# Patient Record
Sex: MALE | Race: WHITE
[De-identification: names, ages, dates, MRNs, and addresses within clinical notes are randomized per-mention and may not be internally consistent; named-entity substitution may affect disease eponyms.]

---

## 2018-06-09 PROBLEM — Z00.00 ENCOUNTER FOR PREVENTIVE HEALTH EXAMINATION: Status: ACTIVE | Noted: 2018-06-09

## 2018-07-09 ENCOUNTER — APPOINTMENT (OUTPATIENT)
Dept: CARDIOLOGY | Facility: CLINIC | Age: 83
End: 2018-07-09

## 2018-07-09 VITALS
DIASTOLIC BLOOD PRESSURE: 75 MMHG | OXYGEN SATURATION: 96 % | HEART RATE: 56 BPM | HEIGHT: 66 IN | SYSTOLIC BLOOD PRESSURE: 189 MMHG | BODY MASS INDEX: 23.46 KG/M2 | WEIGHT: 146 LBS | TEMPERATURE: 97.5 F

## 2018-07-09 DIAGNOSIS — Z82.49 FAMILY HISTORY OF ISCHEMIC HEART DISEASE AND OTHER DISEASES OF THE CIRCULATORY SYSTEM: ICD-10-CM

## 2018-07-09 DIAGNOSIS — Z80.0 FAMILY HISTORY OF MALIGNANT NEOPLASM OF DIGESTIVE ORGANS: ICD-10-CM

## 2018-07-09 DIAGNOSIS — Z86.39 PERSONAL HISTORY OF OTHER ENDOCRINE, NUTRITIONAL AND METABOLIC DISEASE: ICD-10-CM

## 2018-07-09 DIAGNOSIS — Z86.59 PERSONAL HISTORY OF OTHER MENTAL AND BEHAVIORAL DISORDERS: ICD-10-CM

## 2018-07-09 DIAGNOSIS — Z86.69 PERSONAL HISTORY OF OTHER DISEASES OF THE NERVOUS SYSTEM AND SENSE ORGANS: ICD-10-CM

## 2018-07-09 DIAGNOSIS — Z86.79 PERSONAL HISTORY OF OTHER DISEASES OF THE CIRCULATORY SYSTEM: ICD-10-CM

## 2018-07-09 DIAGNOSIS — Z87.891 PERSONAL HISTORY OF NICOTINE DEPENDENCE: ICD-10-CM

## 2018-07-09 DIAGNOSIS — E53.8 DEFICIENCY OF OTHER SPECIFIED B GROUP VITAMINS: ICD-10-CM

## 2018-07-09 DIAGNOSIS — S72.009A FRACTURE OF UNSPECIFIED PART OF NECK OF UNSPECIFIED FEMUR, INITIAL ENCOUNTER FOR CLOSED FRACTURE: ICD-10-CM

## 2018-07-09 DIAGNOSIS — M19.90 UNSPECIFIED OSTEOARTHRITIS, UNSPECIFIED SITE: ICD-10-CM

## 2018-07-22 ENCOUNTER — NON-APPOINTMENT (OUTPATIENT)
Age: 83
End: 2018-07-22

## 2018-07-22 PROBLEM — Z80.0 FAMILY HISTORY OF MALIGNANT NEOPLASM OF COLON: Status: ACTIVE | Noted: 2018-07-22

## 2018-07-22 PROBLEM — Z86.39 HISTORY OF DIABETES MELLITUS: Status: RESOLVED | Noted: 2018-07-22 | Resolved: 2018-07-22

## 2018-07-22 PROBLEM — Z86.69 HISTORY OF PERIPHERAL NEUROPATHY: Status: RESOLVED | Noted: 2018-07-22 | Resolved: 2018-07-22

## 2018-07-22 PROBLEM — Z86.79 HISTORY OF HYPERTENSION: Status: RESOLVED | Noted: 2018-07-22 | Resolved: 2018-07-22

## 2018-07-22 PROBLEM — Z86.39 HISTORY OF HYPERLIPIDEMIA: Status: RESOLVED | Noted: 2018-07-22 | Resolved: 2018-07-22

## 2018-07-22 PROBLEM — Z86.59 HISTORY OF DEMENTIA: Status: RESOLVED | Noted: 2018-07-22 | Resolved: 2018-07-22

## 2018-07-22 PROBLEM — S72.009A CLOSED FRACTURE OF NECK OF FEMUR: Status: RESOLVED | Noted: 2018-07-22 | Resolved: 2018-07-22

## 2018-07-22 PROBLEM — Z87.891 FORMER SMOKER: Status: ACTIVE | Noted: 2018-07-22

## 2018-07-22 PROBLEM — Z82.49 FAMILY HISTORY OF CONGESTIVE HEART FAILURE: Status: ACTIVE | Noted: 2018-07-22

## 2018-07-22 PROBLEM — Z86.79 HISTORY OF PERIPHERAL ARTERIAL DISEASE: Status: RESOLVED | Noted: 2018-07-22 | Resolved: 2018-07-22

## 2018-07-22 PROBLEM — E53.8 VITAMIN B12 DEFICIENCY: Status: RESOLVED | Noted: 2018-07-22 | Resolved: 2018-07-22

## 2018-07-22 PROBLEM — Z86.69 HISTORY OF DEAFNESS: Status: RESOLVED | Noted: 2018-07-22 | Resolved: 2018-07-22

## 2018-07-22 PROBLEM — M19.90 OSTEOARTHRITIS: Status: RESOLVED | Noted: 2018-07-22 | Resolved: 2018-07-22

## 2018-07-22 PROBLEM — Z82.49 FAMILY HISTORY OF ACUTE MYOCARDIAL INFARCTION: Status: ACTIVE | Noted: 2018-07-22

## 2018-09-07 ENCOUNTER — APPOINTMENT (OUTPATIENT)
Dept: CARDIOLOGY | Facility: CLINIC | Age: 83
End: 2018-09-07

## 2018-09-07 VITALS
OXYGEN SATURATION: 98 % | DIASTOLIC BLOOD PRESSURE: 76 MMHG | TEMPERATURE: 97.8 F | BODY MASS INDEX: 22.13 KG/M2 | HEIGHT: 67 IN | HEART RATE: 60 BPM | WEIGHT: 141 LBS | SYSTOLIC BLOOD PRESSURE: 185 MMHG

## 2018-09-09 ENCOUNTER — NON-APPOINTMENT (OUTPATIENT)
Age: 83
End: 2018-09-09

## 2018-09-10 LAB
ANION GAP SERPL CALC-SCNC: 14 MMOL/L
BASOPHILS # BLD AUTO: 0.01 K/UL
BASOPHILS NFR BLD AUTO: 0.2 %
BUN SERPL-MCNC: 14 MG/DL
CALCIUM SERPL-MCNC: 9.1 MG/DL
CHLORIDE SERPL-SCNC: 104 MMOL/L
CHOLEST SERPL-MCNC: 132 MG/DL
CHOLEST/HDLC SERPL: 3.7 RATIO
CO2 SERPL-SCNC: 25 MMOL/L
CREAT SERPL-MCNC: 0.95 MG/DL
EOSINOPHIL # BLD AUTO: 0.1 K/UL
EOSINOPHIL NFR BLD AUTO: 1.5 %
GLUCOSE SERPL-MCNC: 110 MG/DL
HCT VFR BLD CALC: 40.3 %
HDLC SERPL-MCNC: 36 MG/DL
HGB BLD-MCNC: 12.4 G/DL
IMM GRANULOCYTES NFR BLD AUTO: 0.2 %
LDLC SERPL CALC-MCNC: 82 MG/DL
LYMPHOCYTES # BLD AUTO: 1.01 K/UL
LYMPHOCYTES NFR BLD AUTO: 15.2 %
MAN DIFF?: NORMAL
MCHC RBC-ENTMCNC: 30.6 PG
MCHC RBC-ENTMCNC: 30.8 GM/DL
MCV RBC AUTO: 99.5 FL
MONOCYTES # BLD AUTO: 0.53 K/UL
MONOCYTES NFR BLD AUTO: 8 %
NEUTROPHILS # BLD AUTO: 5 K/UL
NEUTROPHILS NFR BLD AUTO: 74.9 %
PLATELET # BLD AUTO: 325 K/UL
POTASSIUM SERPL-SCNC: 4.4 MMOL/L
RBC # BLD: 4.05 M/UL
RBC # FLD: 15.1 %
SODIUM SERPL-SCNC: 143 MMOL/L
TRIGL SERPL-MCNC: 68 MG/DL
WBC # FLD AUTO: 6.66 K/UL

## 2018-10-29 ENCOUNTER — APPOINTMENT (OUTPATIENT)
Dept: CARDIOLOGY | Facility: CLINIC | Age: 83
End: 2018-10-29

## 2018-11-26 ENCOUNTER — RX RENEWAL (OUTPATIENT)
Age: 83
End: 2018-11-26

## 2018-12-18 ENCOUNTER — RX RENEWAL (OUTPATIENT)
Age: 83
End: 2018-12-18

## 2018-12-31 ENCOUNTER — RX RENEWAL (OUTPATIENT)
Age: 83
End: 2018-12-31

## 2019-01-01 ENCOUNTER — APPOINTMENT (OUTPATIENT)
Dept: INTERNAL MEDICINE | Facility: CLINIC | Age: 84
End: 2019-01-01

## 2019-01-01 ENCOUNTER — RX RENEWAL (OUTPATIENT)
Age: 84
End: 2019-01-01

## 2019-01-01 ENCOUNTER — APPOINTMENT (OUTPATIENT)
Dept: INTERNAL MEDICINE | Facility: CLINIC | Age: 84
End: 2019-01-01
Payer: MEDICARE

## 2019-01-01 VITALS
HEIGHT: 67 IN | WEIGHT: 136 LBS | SYSTOLIC BLOOD PRESSURE: 120 MMHG | DIASTOLIC BLOOD PRESSURE: 68 MMHG | BODY MASS INDEX: 21.35 KG/M2

## 2019-01-01 DIAGNOSIS — Z12.5 ENCOUNTER FOR SCREENING FOR MALIGNANT NEOPLASM OF PROSTATE: ICD-10-CM

## 2019-01-01 DIAGNOSIS — F32.9 MAJOR DEPRESSIVE DISORDER, SINGLE EPISODE, UNSPECIFIED: ICD-10-CM

## 2019-01-01 DIAGNOSIS — I25.10 ATHEROSCLEROTIC HEART DISEASE OF NATIVE CORONARY ARTERY W/OUT ANGINA PECTORIS: ICD-10-CM

## 2019-01-01 LAB
ALBUMIN SERPL ELPH-MCNC: 4.1 G/DL
ALP BLD-CCNC: 81 U/L
ALT SERPL-CCNC: 9 U/L
ANION GAP SERPL CALC-SCNC: 15 MMOL/L
AST SERPL-CCNC: 14 U/L
BASOPHILS # BLD AUTO: 0.03 K/UL
BASOPHILS NFR BLD AUTO: 0.4 %
BILIRUB SERPL-MCNC: 0.5 MG/DL
BUN SERPL-MCNC: 23 MG/DL
CALCIUM SERPL-MCNC: 9.6 MG/DL
CHLORIDE SERPL-SCNC: 104 MMOL/L
CHOLEST SERPL-MCNC: 148 MG/DL
CHOLEST/HDLC SERPL: 3.1 RATIO
CO2 SERPL-SCNC: 25 MMOL/L
CREAT SERPL-MCNC: 0.94 MG/DL
EOSINOPHIL # BLD AUTO: 0.17 K/UL
EOSINOPHIL NFR BLD AUTO: 2 %
ESTIMATED AVERAGE GLUCOSE: 171 MG/DL
GLUCOSE SERPL-MCNC: 205 MG/DL
HBA1C MFR BLD HPLC: 7.6 %
HCT VFR BLD CALC: 41.9 %
HDLC SERPL-MCNC: 48 MG/DL
HGB BLD-MCNC: 13.6 G/DL
IMM GRANULOCYTES NFR BLD AUTO: 0.2 %
LDLC SERPL CALC-MCNC: 86 MG/DL
LYMPHOCYTES # BLD AUTO: 1.33 K/UL
LYMPHOCYTES NFR BLD AUTO: 15.8 %
MAN DIFF?: NORMAL
MCHC RBC-ENTMCNC: 32.5 GM/DL
MCHC RBC-ENTMCNC: 32.6 PG
MCV RBC AUTO: 100.5 FL
MONOCYTES # BLD AUTO: 0.58 K/UL
MONOCYTES NFR BLD AUTO: 6.9 %
NEUTROPHILS # BLD AUTO: 6.29 K/UL
NEUTROPHILS NFR BLD AUTO: 74.7 %
PLATELET # BLD AUTO: 232 K/UL
POTASSIUM SERPL-SCNC: 4.6 MMOL/L
PROT SERPL-MCNC: 7.1 G/DL
PSA SERPL-MCNC: 2.96 NG/ML
RBC # BLD: 4.17 M/UL
RBC # FLD: 13.9 %
SODIUM SERPL-SCNC: 144 MMOL/L
TRIGL SERPL-MCNC: 71 MG/DL
TSH SERPL-ACNC: 1.89 UIU/ML
WBC # FLD AUTO: 8.42 K/UL

## 2019-01-01 PROCEDURE — 36415 COLL VENOUS BLD VENIPUNCTURE: CPT

## 2019-01-01 PROCEDURE — G0008: CPT

## 2019-01-01 PROCEDURE — G0439: CPT

## 2019-01-01 PROCEDURE — 90662 IIV NO PRSV INCREASED AG IM: CPT

## 2019-01-01 RX ORDER — ASPIRIN 81 MG
81 TABLET, DELAYED RELEASE (ENTERIC COATED) ORAL
Refills: 0 | Status: ACTIVE | COMMUNITY

## 2019-01-01 RX ORDER — MEMANTINE HYDROCHLORIDE 14 MG/1
14 CAPSULE, EXTENDED RELEASE ORAL
Refills: 0 | Status: DISCONTINUED | COMMUNITY
End: 2019-01-01

## 2019-01-08 ENCOUNTER — APPOINTMENT (OUTPATIENT)
Dept: CARDIOLOGY | Facility: CLINIC | Age: 84
End: 2019-01-08
Payer: MEDICARE

## 2019-01-08 VITALS
HEART RATE: 67 BPM | SYSTOLIC BLOOD PRESSURE: 173 MMHG | BODY MASS INDEX: 23.81 KG/M2 | WEIGHT: 152 LBS | OXYGEN SATURATION: 95 % | DIASTOLIC BLOOD PRESSURE: 66 MMHG

## 2019-01-08 DIAGNOSIS — Z86.79 PERSONAL HISTORY OF OTHER DISEASES OF THE CIRCULATORY SYSTEM: ICD-10-CM

## 2019-01-08 PROCEDURE — 99215 OFFICE O/P EST HI 40 MIN: CPT

## 2019-01-08 NOTE — REVIEW OF SYSTEMS
[Feeling Fatigued] : feeling fatigued [Loss Of Hearing] : hearing loss [see HPI] : see HPI [Chest Pain] : chest pain [Joint Pain] : joint pain [Negative] : Psychiatric

## 2019-01-12 NOTE — PHYSICAL EXAM
[Normal Conjunctiva] : the conjunctiva exhibited no abnormalities [Auscultation Breath Sounds / Voice Sounds] : lungs were clear to auscultation bilaterally [Heart Rate And Rhythm] : heart rate and rhythm were normal [Heart Sounds] : normal S1 and S2 [Murmurs] : no murmurs present [Edema] : no peripheral edema present [Abdomen Soft] : soft [Abnormal Walk] : normal gait [Cyanosis, Localized] : no localized cyanosis [] : no rash [FreeTextEntry1] : toes cool with diminished dorsalis pedis pulses. no  foot ulcers

## 2019-01-12 NOTE — HISTORY OF PRESENT ILLNESS
[FreeTextEntry1] : 89-year-old man\par Routine followup\par " I feel okay" Mr. Caldera specifically denies symptoms of chest pain, shortness of breath at rest palpitations or syncope. No ankle edema no orthopnea. Dyspnea on exertion is unchanged from in the past and not progressive or severe.\par \par Patrick is accompanied today by his son

## 2019-01-12 NOTE — DISCUSSION/SUMMARY
[FreeTextEntry1] : Atherosclerotic heart disease\par Atherosclerotic heart disease is stable. A 12/08 coronary arteriogram revealed a 60% LAD lesion. The RCA was 100% occluded. The left circumflex was patent. A second obtuse marginal branch had 100% occlusion. Medical therapy was advised. 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 Left ventricle systolic function is depressed as reflected by a left ventricular ejection fraction of 27% on the 3/17 gated sestamibi study and 40-45% on the 3/17 echocardiogram.However there has been a favorable response to medical intervention. The 10/18 echocardiogram revealed a left ventricle in diastolic dimension normal at 4.0 cm and an ejection fraction of 50-55%. There is no clinical congestive heart.   In view of the lack of angina above noninvasive studies patient's age and clinical course continued medical management is indicated.\par \par I have recommended the following:\par 1 continue the present medical regimen\par 2 No further cardiac testing for this problem at this time\par \par \par Valvular heart disease\par P. 10/18 echocardiogram demonstrated mild mitral regurgitation. The aortic valve was sclerotic with mild aortic regurgitation. Mild pulmonary hypertension was noted with a pulmonary artery systolic pressure 46 mmHg. The left ventricular ejection fraction was 50-55%. The present cardiac physical examination is not suggestive of severe mitral/aortic insufficiency. 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 Left ventricular systolic dysfunction was reflected by a left ventricular ejection fraction of 27% on the 3/17 gated sestamibi study and 40-45% on 3/17 echocardiogram. However there has been a favorable response to medical intervention. The 10/18 echocardiogram demonstrated a normal left ventricular end-diastolic dimension of 4.0 cm. The left ventricular ejection fraction was 50-55%.The present cardiac physical examination  and the 9/18 chest x ray is consistent with a euvolemic state New York Heart Association class II.\par \par I recommend the following:\par 1 continue the present medical regimen\par 2  No further cardiac testing for this problem at this time\par \par \par \par Hypertension\par Hypertension has been controlled on the present medical regimen. In the setting of atherosclerotic heart disease, diabetes and left ventricular systolic dysfunction Ace -I/ARB, beta blocker and neprylisin inhibition therapy are attractive. Elevation of the blood pressure on initial examination today (173/66 ) may in  part be related to a "white coat effect." Home blood pressure readings have reportedly been normal   Followup blood pressure checks will be helpful to determine requirements for additional antihypertensive medical intervention.\par \par I have recommended the following:\par 1 continue the present medical regimen\par 2. Low salt low-fat diabetic diet. Exercise to the extent possible\par 3 home blood pressure monitoring\par 4 additional antihypertensive medical intervention depending on the above\par \par

## 2019-01-28 ENCOUNTER — RX RENEWAL (OUTPATIENT)
Age: 84
End: 2019-01-28

## 2019-03-18 ENCOUNTER — APPOINTMENT (OUTPATIENT)
Dept: INTERNAL MEDICINE | Facility: CLINIC | Age: 84
End: 2019-03-18
Payer: MEDICARE

## 2019-03-18 VITALS
DIASTOLIC BLOOD PRESSURE: 70 MMHG | SYSTOLIC BLOOD PRESSURE: 146 MMHG | HEIGHT: 67 IN | WEIGHT: 148 LBS | BODY MASS INDEX: 23.23 KG/M2

## 2019-03-18 PROCEDURE — 36415 COLL VENOUS BLD VENIPUNCTURE: CPT

## 2019-03-18 PROCEDURE — 99214 OFFICE O/P EST MOD 30 MIN: CPT | Mod: 25

## 2019-03-18 RX ORDER — FEXOFENADINE HCL 60 MG/1
TABLET, FILM COATED ORAL
Refills: 0 | Status: DISCONTINUED | COMMUNITY
End: 2019-03-18

## 2019-03-18 RX ORDER — SITAGLIPTIN AND METFORMIN HYDROCHLORIDE 50; 1000 MG/1; MG/1
50-1000 TABLET, FILM COATED, EXTENDED RELEASE ORAL
Refills: 0 | Status: DISCONTINUED | COMMUNITY
End: 2019-03-18

## 2019-03-18 NOTE — COUNSELING
[Activity counseling provided] : activity [de-identified] : advised pt to get some more physical activity in the house.

## 2019-03-18 NOTE — PHYSICAL EXAM
[No Acute Distress] : no acute distress [Well Nourished] : well nourished [Well Developed] : well developed [Well-Appearing] : well-appearing [No Respiratory Distress] : no respiratory distress  [Clear to Auscultation] : lungs were clear to auscultation bilaterally [No Accessory Muscle Use] : no accessory muscle use [Normal Rate] : normal rate  [Regular Rhythm] : with a regular rhythm [Normal S1, S2] : normal S1 and S2 [No Murmur] : no murmur heard [No Edema] : there was no peripheral edema [Normal Affect] : the affect was normal [Alert and Oriented x3] : oriented to person, place, and time [Normal Mood] : the mood was normal [Supple] : supple [No Lymphadenopathy] : no lymphadenopathy [No Focal Deficits] : no focal deficits [de-identified] : walking with cane

## 2019-03-18 NOTE — HISTORY OF PRESENT ILLNESS
[Family Member] : family member [FreeTextEntry1] : 6 month follow up [de-identified] : Pt here with his son for 6 month f/u. Feeling well. Pt is not very active overall. He reads. Pt ambulates with a cane but with assistance as it would not be safe for him to ambulate alone. \par no complaints of chest pain, but SOB on minimal exertion. As per son he is SOB after dressing himself. \par His son is present here.

## 2019-03-18 NOTE — HEALTH RISK ASSESSMENT
[No falls in past year] : Patient reported no falls in the past year [0] : 2) Feeling down, depressed, or hopeless: Not at all (0) [de-identified] : none [] : No [de-identified] : none

## 2019-03-18 NOTE — REVIEW OF SYSTEMS
[Shortness Of Breath] : shortness of breath [Unsteady Walk] : ataxia [Negative] : Gastrointestinal [Chest Pain] : no chest pain [Fever] : no fever [Lower Ext Edema] : no lower extremity edema [Wheezing] : no wheezing [Cough] : no cough [Depression] : no depression [Dyspnea on Exertion] : not dyspnea on exertion [de-identified] : walks with cane

## 2019-03-19 LAB
ALBUMIN SERPL ELPH-MCNC: 4.3 G/DL
ALP BLD-CCNC: 80 U/L
ALT SERPL-CCNC: 5 U/L
ANION GAP SERPL CALC-SCNC: 13 MMOL/L
AST SERPL-CCNC: 11 U/L
BILIRUB SERPL-MCNC: 0.4 MG/DL
BUN SERPL-MCNC: 18 MG/DL
CALCIUM SERPL-MCNC: 9.5 MG/DL
CHLORIDE SERPL-SCNC: 106 MMOL/L
CO2 SERPL-SCNC: 25 MMOL/L
CREAT SERPL-MCNC: 0.92 MG/DL
GLUCOSE SERPL-MCNC: 139 MG/DL
HBA1C MFR BLD HPLC: 6.9 %
POTASSIUM SERPL-SCNC: 4.3 MMOL/L
PROT SERPL-MCNC: 7.1 G/DL
SODIUM SERPL-SCNC: 144 MMOL/L

## 2019-03-22 ENCOUNTER — RX RENEWAL (OUTPATIENT)
Age: 84
End: 2019-03-22

## 2019-04-29 ENCOUNTER — RX RENEWAL (OUTPATIENT)
Age: 84
End: 2019-04-29

## 2019-05-27 ENCOUNTER — RX RENEWAL (OUTPATIENT)
Age: 84
End: 2019-05-27

## 2019-06-25 ENCOUNTER — APPOINTMENT (OUTPATIENT)
Dept: CARDIOLOGY | Facility: CLINIC | Age: 84
End: 2019-06-25
Payer: MEDICARE

## 2019-06-25 VITALS
HEIGHT: 67 IN | DIASTOLIC BLOOD PRESSURE: 46 MMHG | BODY MASS INDEX: 23.09 KG/M2 | HEART RATE: 58 BPM | OXYGEN SATURATION: 95 % | WEIGHT: 147.13 LBS | SYSTOLIC BLOOD PRESSURE: 111 MMHG

## 2019-06-25 PROCEDURE — 93000 ELECTROCARDIOGRAM COMPLETE: CPT

## 2019-06-25 PROCEDURE — 99215 OFFICE O/P EST HI 40 MIN: CPT

## 2019-07-01 ENCOUNTER — NON-APPOINTMENT (OUTPATIENT)
Age: 84
End: 2019-07-01

## 2019-07-01 NOTE — HISTORY OF PRESENT ILLNESS
[FreeTextEntry1] : 89-year-old man\par Routine followup\par Main complaint continues to be that of " weak legs." Mr. Caldera denies chest pain, shortness of breath, palpitations or syncope.\par \par Patrick is accompanied today by his son.

## 2019-07-01 NOTE — PHYSICAL EXAM
[Normal Conjunctiva] : the conjunctiva exhibited no abnormalities [Auscultation Breath Sounds / Voice Sounds] : lungs were clear to auscultation bilaterally [Heart Rate And Rhythm] : heart rate and rhythm were normal [Heart Sounds] : normal S1 and S2 [Murmurs] : no murmurs present [Edema] : no peripheral edema present [Abdomen Soft] : soft [Abnormal Walk] : normal gait [Cyanosis, Localized] : no localized cyanosis [] : no rash [Affect] : the affect was normal [FreeTextEntry1] : pleasant

## 2019-07-01 NOTE — DISCUSSION/SUMMARY
[FreeTextEntry1] : Atherosclerotic heart disease\par Atherosclerotic heart disease is stable. A 12/08 coronary arteriogram revealed a 60% LAD lesion. The RCA was 100% occluded. The left circumflex was patent. A second obtuse marginal branch had 100% occlusion. Medical therapy was advised. 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 Left ventricle systolic function is depressed as reflected by a left ventricular ejection fraction of 27% on the 3/17 gated sestamibi study and 40-45% on the 3/17 echocardiogram.However there has been a favorable response to medical intervention. The 10/18 echocardiogram revealed a left ventricle in diastolic dimension normal at 4.0 cm and an ejection fraction of 50-55%. There is no clinical congestive heart failure.   In view of the lack of angina above noninvasive studies patient's age and clinical course continued medical management is indicated.\par \par I have recommended the following:\par 1 continue the present medical regimen\par 2 No further cardiac testing for this problem at this time\par \par \par Valvular heart disease\par The  10/18 echocardiogram demonstrated mild mitral regurgitation. The aortic valve was sclerotic with mild aortic regurgitation. Mild pulmonary hypertension was noted with a pulmonary artery systolic pressure 46 mmHg. The left ventricular ejection fraction was 50-55%. The present cardiac physical examination is not suggestive of severe mitral/aortic insufficiency. 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 Left ventricular systolic dysfunction was reflected by a left ventricular ejection fraction of 27% on the 3/17 gated sestamibi study and 40-45% on 3/17 echocardiogram. However there has been a favorable response to medical intervention. The 10/18 echocardiogram demonstrated a normal left ventricular end-diastolic dimension of 4.0 cm. The left ventricular ejection fraction was 50-55%.The present cardiac physical examination  and the 9/18 chest x ray are  consistent with a euvolemic state New York Heart Association class II.\par \par I have  recommended  the following:\par 1 continue the present medical regimen\par 2  No further cardiac testing for this problem at this time\par \par \par \par Hypertension\par Hypertension has been controlled on the present medical regimen. In the setting of atherosclerotic heart disease, diabetes and left ventricular systolic dysfunction Ace -I/ARB, beta blocker and neprilysin  inhibition therapy are attractive. Home blood pressure readings have reportedly been normal  \par \par I have recommended the following:\par 1 continue the present medical regimen\par 2. Low salt low-fat diabetic diet. Exercise to the extent possible\par 3 home blood pressure monitoring\par \par \par \par Hyperlipidemia\par Hyperlipidemia represents a risk factor for progressive atherosclerotic disease. The target LDL level with known atherosclerotic heart and peripheral vascular disease is about 70. HMG-CoA duct ACE inhibitor therapy has been effective. In 9/18 the serum cholesterol level was 136 HDL 42 LDL 82 and triglycerides 56. Nonpharmacological therapy, specifically diet and exercise to the extent possible I emphasized his major aspects of treatment.\par \par I have recommended the following:\par 1 low-salt low-fat low-cholesterol diabetic diet. Exercise to the extent possible\par 2 continue the present medical regimen\par 3 routine laboratory studies including fasting lipid profile through primary care Dr. Larios\par 4 target LDL level to about 70 as discussed above\par \par \par

## 2019-07-30 ENCOUNTER — RX RENEWAL (OUTPATIENT)
Age: 84
End: 2019-07-30

## 2019-08-02 ENCOUNTER — RX RENEWAL (OUTPATIENT)
Age: 84
End: 2019-08-02

## 2019-08-02 RX ORDER — PEN NEEDLE, DIABETIC 29 G X1/2"
31G X 5 MM NEEDLE, DISPOSABLE MISCELLANEOUS
Qty: 150 | Refills: 10 | Status: ACTIVE | COMMUNITY
Start: 2019-08-02 | End: 1900-01-01

## 2019-09-04 ENCOUNTER — RX RENEWAL (OUTPATIENT)
Age: 84
End: 2019-09-04

## 2019-11-25 PROBLEM — I25.10 CORONARY ATHEROSCLEROSIS OF NATIVE CORONARY ARTERY: Status: RESOLVED | Noted: 2018-07-09 | Resolved: 2019-01-01

## 2019-11-25 PROBLEM — Z12.5 SCREENING PSA (PROSTATE SPECIFIC ANTIGEN): Status: ACTIVE | Noted: 2019-01-01

## 2019-11-25 PROBLEM — F32.9 DEPRESSION: Status: ACTIVE | Noted: 2019-02-22

## 2019-11-25 NOTE — COUNSELING
[Fall prevention counseling provided] : Fall prevention counseling provided [FreeTextEntry2] : tried to encourage him to do some strengthening exercises

## 2019-11-25 NOTE — REVIEW OF SYSTEMS
[Dyspnea on Exertion] : dyspnea on exertion [Incontinence] : incontinence [Negative] : Cardiovascular [Wheezing] : no wheezing [Shortness Of Breath] : no shortness of breath [Cough] : no cough [FreeTextEntry2] : weakness [FreeTextEntry9] : weaker [FreeTextEntry4] : hearing issues, has hearing aids

## 2019-11-25 NOTE — PHYSICAL EXAM
[No Acute Distress] : no acute distress [Well-Appearing] : well-appearing [Normal Sclera/Conjunctiva] : normal sclera/conjunctiva [EOMI] : extraocular movements intact [No Lymphadenopathy] : no lymphadenopathy [No JVD] : no jugular venous distention [No Respiratory Distress] : no respiratory distress  [No Accessory Muscle Use] : no accessory muscle use [Clear to Auscultation] : lungs were clear to auscultation bilaterally [Normal Rate] : normal rate  [Normal S1, S2] : normal S1 and S2 [Regular Rhythm] : with a regular rhythm [No Edema] : there was no peripheral edema [Soft] : abdomen soft [Non Tender] : non-tender [Normal Bowel Sounds] : normal bowel sounds [No Joint Swelling] : no joint swelling [No Focal Deficits] : no focal deficits [Normal Affect] : the affect was normal [Normal Mood] : the mood was normal [de-identified] : thin [de-identified] : no murmur ascultated [de-identified] : hearing aids in place

## 2019-11-25 NOTE — HISTORY OF PRESENT ILLNESS
[PMH Reviewed and Updated] : past medical history reviewed and updated [PSH Reviewed and Updated] : past surgical history reviewed and updated [Family History Reviewed and Updated] : family history reviewed and updated [Medication and Allergies Reconciled] : medication and allergies reconciled [0] : 0 [___ Sons] : [unfilled] son(s) [Retired] : retired from work [Lactose Free Diet] : lactose free [Children] : children [Compliant with medications] : compliant with medications [None] : The patient does not exercise [Extended Family] : extended family [Needs some help with ADLs] : need some help with ADLs [Does not drive] : does not drive [History of falls] : history of falls [Seatbelts] : seatbelts [Patient Has Designated Health Care Proxy/Advanced Directive] : patient has designated Health Care Proxy/Advanced Directive [Family Member] : family member [de-identified] : Pt is here with his son.  Over the past year he had one fall in the middle of the night. Pt says he lost his balance while in the bathroom bout 2 months ago. He did not injure himself. He has an upcoming assessment by [facility]. he may be eligible for grab bars. He has a shower chair. \par As per son pt is getting weaker overall.  Pt did have a visit from a physical therapist. He knows what exercises to do, but does not do them.  [Over the Past 2 Weeks, Have You Felt Down, Depressed, or Hopeless?] : 1.) Over the past 2 weeks, have you felt down, depressed, or hopeless? No [Over the Past 2 Weeks, Have You Felt Little Interest or Pleasure Doing Things?] : 2.) Over the past 2 weeks, have you felt little interest or pleasure doing things? No [Bicycle Helmet] : not using bicycle helmet [Safe Driving Habits] : not using safe driving habits [Motorcycle Helmet] : not using motorcycle helmet [Smoke Detectors] : not using smoke detectors [Hot Water Temp <120F] : not using hot water temp <120F [Carbon Monoxide Detector] : not using carbon monoxide detector [Bathroom Grab Bars] : not using bathroom grab bars [Fall Prevention Measures] : not using fall prevention measures [Gun Trigger Locks] : not using gun trigger locks [Gun Safe] : not using a gun safe [CPR Training for Household] : did not receive CPR training for patient [CPR Training for Patient] : did not receive CPR training for patient [Sunscreen] : not using sunscreen [FreeTextEntry2] : none [de-identified] : none [de-identified] : none [FreeTextEntry1] : regular diet  [FreeTextEntry9] : none

## 2020-01-01 ENCOUNTER — RX RENEWAL (OUTPATIENT)
Age: 85
End: 2020-01-01

## 2020-01-01 ENCOUNTER — APPOINTMENT (OUTPATIENT)
Dept: INTERNAL MEDICINE | Facility: CLINIC | Age: 85
End: 2020-01-01
Payer: MEDICARE

## 2020-01-01 ENCOUNTER — APPOINTMENT (OUTPATIENT)
Dept: CARDIOLOGY | Facility: CLINIC | Age: 85
End: 2020-01-01
Payer: MEDICARE

## 2020-01-01 ENCOUNTER — APPOINTMENT (OUTPATIENT)
Dept: CARDIOLOGY | Facility: CLINIC | Age: 85
End: 2020-01-01

## 2020-01-01 VITALS — SYSTOLIC BLOOD PRESSURE: 118 MMHG | HEIGHT: 67 IN | DIASTOLIC BLOOD PRESSURE: 68 MMHG | TEMPERATURE: 97.7 F

## 2020-01-01 VITALS
HEART RATE: 55 BPM | WEIGHT: 143 LBS | DIASTOLIC BLOOD PRESSURE: 54 MMHG | BODY MASS INDEX: 22.4 KG/M2 | OXYGEN SATURATION: 94 % | SYSTOLIC BLOOD PRESSURE: 128 MMHG

## 2020-01-01 DIAGNOSIS — R73.9 HYPERGLYCEMIA, UNSPECIFIED: ICD-10-CM

## 2020-01-01 DIAGNOSIS — I10 ESSENTIAL (PRIMARY) HYPERTENSION: ICD-10-CM

## 2020-01-01 DIAGNOSIS — R06.02 SHORTNESS OF BREATH: ICD-10-CM
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
## 2020-01-01 DIAGNOSIS — I38 ENDOCARDITIS, VALVE UNSPECIFIED: ICD-10-CM

## 2020-01-01 DIAGNOSIS — E78.5 HYPERLIPIDEMIA, UNSPECIFIED: ICD-10-CM

## 2020-01-01 DIAGNOSIS — S01.81XA LACERATION W/OUT FOREIGN BODY OF OTHER PART OF HEAD, INITIAL ENCOUNTER: ICD-10-CM

## 2020-01-01 DIAGNOSIS — T68.XXXA HYPOTHERMIA, INITIAL ENCOUNTER: ICD-10-CM

## 2020-01-01 DIAGNOSIS — I42.9 CARDIOMYOPATHY, UNSPECIFIED: ICD-10-CM

## 2020-01-01 DIAGNOSIS — R35.0 FREQUENCY OF MICTURITION: ICD-10-CM

## 2020-01-01 DIAGNOSIS — Z63.4 DISAPPEARANCE AND DEATH OF FAMILY MEMBER: ICD-10-CM

## 2020-01-01 DIAGNOSIS — F03.90 UNSPECIFIED DEMENTIA W/OUT BEHAVIORAL DISTURBANCE: ICD-10-CM

## 2020-01-01 DIAGNOSIS — R53.1 WEAKNESS: ICD-10-CM

## 2020-01-01 DIAGNOSIS — I50.22 CHRONIC SYSTOLIC (CONGESTIVE) HEART FAILURE: ICD-10-CM

## 2020-01-01 DIAGNOSIS — E11.9 TYPE 2 DIABETES MELLITUS W/OUT COMPLICATIONS: ICD-10-CM

## 2020-01-01 DIAGNOSIS — Z51.5 ENCOUNTER FOR PALLIATIVE CARE: ICD-10-CM

## 2020-01-01 DIAGNOSIS — D64.9 ANEMIA, UNSPECIFIED: ICD-10-CM

## 2020-01-01 DIAGNOSIS — I25.10 ATHEROSCLEROTIC HEART DISEASE OF NATIVE CORONARY ARTERY W/OUT ANGINA PECTORIS: ICD-10-CM

## 2020-01-01 DIAGNOSIS — R63.0 ANOREXIA: ICD-10-CM

## 2020-01-01 DIAGNOSIS — I63.9 CEREBRAL INFARCTION, UNSPECIFIED: ICD-10-CM

## 2020-01-01 LAB
ALBUMIN SERPL ELPH-MCNC: 3.9 G/DL
ALP BLD-CCNC: 84 U/L
ALT SERPL-CCNC: 10 U/L
ANION GAP SERPL CALC-SCNC: 16 MMOL/L
APPEARANCE: CLEAR
AST SERPL-CCNC: 15 U/L
BACTERIA UR CULT: NORMAL
BACTERIA: NEGATIVE
BASOPHILS # BLD AUTO: 0.01 K/UL
BASOPHILS NFR BLD AUTO: 0.1 %
BILIRUB SERPL-MCNC: 1.1 MG/DL
BILIRUBIN URINE: NEGATIVE
BLOOD URINE: NEGATIVE
BUN SERPL-MCNC: 38 MG/DL
CALCIUM SERPL-MCNC: 9.6 MG/DL
CHLORIDE SERPL-SCNC: 106 MMOL/L
CHOLEST SERPL-MCNC: 122 MG/DL
CHOLEST/HDLC SERPL: 3.2 RATIO
CO2 SERPL-SCNC: 23 MMOL/L
COLOR: YELLOW
CREAT SERPL-MCNC: 0.92 MG/DL
EOSINOPHIL # BLD AUTO: 0.02 K/UL
EOSINOPHIL NFR BLD AUTO: 0.3 %
ESTIMATED AVERAGE GLUCOSE: 154 MG/DL
GLUCOSE QUALITATIVE U: NEGATIVE
GLUCOSE SERPL-MCNC: 292 MG/DL
HBA1C MFR BLD HPLC: 7 %
HCT VFR BLD CALC: 33.8 %
HDLC SERPL-MCNC: 38 MG/DL
HGB BLD-MCNC: 10.8 G/DL
HYALINE CASTS: 0 /LPF
IMM GRANULOCYTES NFR BLD AUTO: 0.3 %
KETONES URINE: NEGATIVE
LDLC SERPL CALC-MCNC: 69 MG/DL
LEUKOCYTE ESTERASE URINE: NEGATIVE
LYMPHOCYTES # BLD AUTO: 0.55 K/UL
LYMPHOCYTES NFR BLD AUTO: 7.6 %
MAN DIFF?: NORMAL
MCHC RBC-ENTMCNC: 32 GM/DL
MCHC RBC-ENTMCNC: 32.7 PG
MCV RBC AUTO: 102.4 FL
MICROSCOPIC-UA: NORMAL
MONOCYTES # BLD AUTO: 0.39 K/UL
MONOCYTES NFR BLD AUTO: 5.4 %
NEUTROPHILS # BLD AUTO: 6.2 K/UL
NEUTROPHILS NFR BLD AUTO: 86.3 %
NITRITE URINE: NEGATIVE
PH URINE: 6
PLATELET # BLD AUTO: 312 K/UL
POTASSIUM SERPL-SCNC: 4.8 MMOL/L
PROT SERPL-MCNC: 6.5 G/DL
PROTEIN URINE: NORMAL
RBC # BLD: 3.3 M/UL
RBC # FLD: 15 %
RED BLOOD CELLS URINE: 0 /HPF
SODIUM SERPL-SCNC: 145 MMOL/L
SPECIFIC GRAVITY URINE: >=1.03
SQUAMOUS EPITHELIAL CELLS: 0 /HPF
TRIGL SERPL-MCNC: 73 MG/DL
UROBILINOGEN URINE: NORMAL
WBC # FLD AUTO: 7.19 K/UL
WHITE BLOOD CELLS URINE: 1 /HPF

## 2020-01-01 PROCEDURE — 99214 OFFICE O/P EST MOD 30 MIN: CPT | Mod: 25

## 2020-01-01 PROCEDURE — 99213 OFFICE O/P EST LOW 20 MIN: CPT | Mod: 95,GV

## 2020-01-01 PROCEDURE — 36415 COLL VENOUS BLD VENIPUNCTURE: CPT

## 2020-01-01 PROCEDURE — 99215 OFFICE O/P EST HI 40 MIN: CPT

## 2020-01-01 PROCEDURE — 99443: CPT | Mod: CR

## 2020-01-01 RX ORDER — CARVEDILOL 25 MG/1
25 TABLET, FILM COATED ORAL
Qty: 180 | Refills: 1 | Status: ACTIVE | COMMUNITY
Start: 2019-03-22 | End: 1900-01-01

## 2020-01-01 RX ORDER — DONEPEZIL HYDROCHLORIDE 10 MG/1
10 TABLET ORAL
Qty: 90 | Refills: 3 | Status: ACTIVE | COMMUNITY
Start: 2019-03-22 | End: 1900-01-01

## 2020-01-01 RX ORDER — INSULIN LISPRO 100 [IU]/ML
100 INJECTION, SOLUTION INTRAVENOUS; SUBCUTANEOUS
Qty: 1 | Refills: 3 | Status: ACTIVE | COMMUNITY
Start: 2020-01-01 | End: 1900-01-01

## 2020-01-01 RX ORDER — SIMVASTATIN 20 MG/1
20 TABLET, FILM COATED ORAL
Qty: 90 | Refills: 3 | Status: ACTIVE | COMMUNITY
Start: 2019-05-27 | End: 1900-01-01

## 2020-01-01 RX ORDER — SACUBITRIL AND VALSARTAN 97; 103 MG/1; MG/1
97-103 TABLET, FILM COATED ORAL
Qty: 180 | Refills: 1 | Status: ACTIVE | COMMUNITY
Start: 2019-01-01 | End: 1900-01-01

## 2020-01-01 RX ORDER — CYPROHEPTADINE HYDROCHLORIDE 4 MG/1
4 TABLET ORAL
Qty: 180 | Refills: 2 | Status: ACTIVE | COMMUNITY
Start: 2018-12-18 | End: 1900-01-01

## 2020-01-01 RX ORDER — MEMANTINE HYDROCHLORIDE 14 MG/1
14 CAPSULE, EXTENDED RELEASE ORAL
Qty: 30 | Refills: 5 | Status: ACTIVE | COMMUNITY
Start: 2019-07-30 | End: 1900-01-01

## 2020-01-01 RX ORDER — INSULIN GLARGINE 100 [IU]/ML
100 INJECTION, SOLUTION SUBCUTANEOUS
Qty: 1 | Refills: 1 | Status: ACTIVE | COMMUNITY
Start: 2018-11-26 | End: 1900-01-01

## 2020-01-01 RX ORDER — FERROUS SULFATE 137(45) MG
142 (45 FE) TABLET, EXTENDED RELEASE ORAL
Qty: 90 | Refills: 1 | Status: ACTIVE | COMMUNITY
Start: 2020-01-01 | End: 1900-01-01

## 2020-01-01 RX ORDER — SITAGLIPTIN AND METFORMIN HYDROCHLORIDE 50; 1000 MG/1; MG/1
50-1000 TABLET, FILM COATED ORAL
Qty: 60 | Refills: 5 | Status: ACTIVE | COMMUNITY
Start: 2019-01-28 | End: 1900-01-01

## 2020-01-01 RX ORDER — INSULIN LISPRO 100 [IU]/ML
INJECTION, SOLUTION INTRAVENOUS; SUBCUTANEOUS
Refills: 0 | Status: DISCONTINUED | COMMUNITY
End: 2020-01-01

## 2020-01-01 RX ORDER — LORAZEPAM 2 MG/ML
2 SOLUTION, CONCENTRATE ORAL
Qty: 1 | Refills: 0 | Status: ACTIVE | COMMUNITY
Start: 2020-01-01 | End: 1900-01-01

## 2020-01-01 RX ORDER — DULOXETINE HYDROCHLORIDE 20 MG/1
20 CAPSULE, DELAYED RELEASE PELLETS ORAL
Qty: 90 | Refills: 1 | Status: ACTIVE | COMMUNITY
Start: 2019-04-29 | End: 1900-01-01

## 2020-01-01 RX ORDER — MORPHINE SULFATE 100 MG/5ML
20 SOLUTION ORAL
Qty: 1 | Refills: 0 | Status: ACTIVE | COMMUNITY
Start: 2020-01-01 | End: 1900-01-01

## 2020-01-01 RX ORDER — ACETAMINOPHEN 650 MG/1
650 SUPPOSITORY RECTAL
Qty: 40 | Refills: 0 | Status: ACTIVE | COMMUNITY
Start: 2020-01-01 | End: 1900-01-01

## 2020-01-01 SDOH — SOCIAL STABILITY - SOCIAL INSECURITY: DISSAPEARANCE AND DEATH OF FAMILY MEMBER: Z63.4

## 2020-01-16 PROBLEM — I38 VALVULAR HEART DISEASE: Status: ACTIVE | Noted: 2018-07-22

## 2020-01-16 PROBLEM — Z63.4 WIDOWER: Status: ACTIVE | Noted: 2020-01-01

## 2020-01-16 PROBLEM — I25.10 ATHEROSCLEROTIC HEART DISEASE: Status: ACTIVE | Noted: 2020-01-01

## 2020-01-16 PROBLEM — I42.9 CARDIOMYOPATHY: Status: ACTIVE | Noted: 2018-07-09

## 2020-01-16 NOTE — DISCUSSION/SUMMARY
[FreeTextEntry1] : Atherosclerotic heart disease\par Mr Caldera has known atherosclerotic heart disease is stable. A 12/08 coronary arteriogram revealed a 60% LAD lesion. The RCA was 100% occluded. The left circumflex was patent. A second obtuse marginal branch had 100% occlusion. Medical therapy was advised. 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 Left ventricle systolic function is depressed as reflected by a left ventricular ejection fraction of 27% on the 3/17 gated sestamibi study and 40-45% on the 3/17 echocardiogram.However there has been a favorable response to medical intervention. The 10/18 echocardiogram revealed a left ventricle in diastolic dimension normal at 4.0 cm and an ejection fraction of 50-55%. There is no clinical congestive heart failure.   In view of the lack of angina above noninvasive studies patient's age and clinical course continued medical management is indicated.\par \par I have recommended the following:\par 1 continue the present medical regimen\par 2 Echocardiogram with next office evaluation in 6 months\par \par \par Valvular heart disease\par The  10/18 echocardiogram demonstrated mild mitral regurgitation. The aortic valve was sclerotic with mild aortic regurgitation. Mild pulmonary hypertension was noted with a pulmonary artery systolic pressure 46 mmHg. The left ventricular ejection fraction was 50-55%. The present cardiac physical examination is not suggestive of severe mitral/aortic insufficiency. 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 Left ventricular systolic dysfunction was reflected by a left ventricular ejection fraction of 27% on the 3/17 gated sestamibi study and 40-45% on 3/17 echocardiogram. However there has been a favorable response to medical intervention. The 10/18 echocardiogram demonstrated a normal left ventricular end-diastolic dimension of 4.0 cm. The left ventricular ejection fraction was 50-55%.The present cardiac physical examination  and the 9/18 chest x ray are  consistent with a euvolemic state New York Heart Association class II.\par \par I have  recommended  the following:\par 1 continue the present medical regimen\par 2  Echocardiogram with next office evaluation in 6 months\par \par \par \par Hypertension\par Hypertension has been controlled on the present medical regimen. In the setting of atherosclerotic heart disease, diabetes and left ventricular systolic dysfunction Ace -I/ARB, beta blocker and neprilysin  inhibition therapy are attractive. Home blood pressure readings have reportedly been normal  \par \par I have recommended the following:\par 1 continue the present medical regimen\par 2. Low salt low-fat diabetic diet. Exercise to the extent possible\par 3 home blood pressure monitoring\par \par \par \par Hyperlipidemia\par Hyperlipidemia represents a risk factor for progressive atherosclerotic disease. The target LDL level with known atherosclerotic heart and peripheral vascular disease is about 70. HMG-CoA duct ACE inhibitor therapy has been effective. In 11/19  the serum cholesterol level was 148  HDL 48 LDL 86 and triglycerides 71. Nonpharmacological therapy, specifically diet and exercise to the extent possible areI emphasized  as  major aspects of treatment.\par \par I have recommended the following:\par 1 low-salt low-fat low-cholesterol diabetic diet. Exercise to the extent possible\par 2 continue the present medical regimen\par 3 routine laboratory studies including fasting lipid profile through primary care Dr. Larios\par 4 target LDL level to about 70 as discussed above\par \par The diagnosis, prognosis, risks, options and alternatives were explained at length to the patient and his family. All questions were answered. Issues discussed included left ventricular systolic dysfunction/cardiomyopathy/congestive heart failure, atherosclerotic heart disease, hypertension and hyperlipidemia.\par Counseling and coordination of care: Time was a significant factor for this patient encounter. Total time spent with the patient was 40 minutes. 50% of the time was devoted to counseling and coordination of care.\par

## 2020-01-16 NOTE — PHYSICAL EXAM
[Normal Conjunctiva] : the conjunctiva exhibited no abnormalities [Auscultation Breath Sounds / Voice Sounds] : lungs were clear to auscultation bilaterally [Heart Rate And Rhythm] : heart rate and rhythm were normal [Murmurs] : no murmurs present [Heart Sounds] : normal S1 and S2 [Edema] : no peripheral edema present [Abdomen Soft] : soft [Abnormal Walk] : normal gait [] : no rash [Affect] : the affect was normal [FreeTextEntry1] : toes cool with diminished dorsalis pedis pulses. no  foot ulcers

## 2020-01-16 NOTE — REVIEW OF SYSTEMS
[Loss Of Hearing] : hearing loss [Feeling Fatigued] : feeling fatigued [see HPI] : see HPI [Chest Pain] : chest pain [Joint Pain] : joint pain [Negative] : Psychiatric

## 2020-01-16 NOTE — HISTORY OF PRESENT ILLNESS
[FreeTextEntry1] : 89-year-old male\par Routine  follow up\par \par Mr. Caldera denies chest pain, shortness of breath, palpitations or syncope. Main complaint is that of imbalance and weak legs. He recently traveled to [place] without difficulty.\par \par Patrick is accompanied today by his son who is the primary caregiver

## 2020-05-08 NOTE — REVIEW OF SYSTEMS
[Depression] : depression [Negative] : Gastrointestinal [Chest Pain] : no chest pain [FreeTextEntry2] : weakness

## 2020-05-08 NOTE — HISTORY OF PRESENT ILLNESS
[FreeTextEntry3] : Gerard Velásquez pt's son [FreeTextEntry8] : Spoke with Gerard, pt's son and care taker. Pt has been getting weaker overall. He has been wiped out going from his bed room to the bathroom which is across the morales from his bedroom. He spends most of his time in his room and only comes out for dinner as Gerard encourages him to come out to eat dinner with the family. \par Pt missed meds on Wednesday night. On Thursday he was not himself and when Gerard checked pt's bgm which was 3 hours after a meal it was 350. Pt's BGM is usually checked as a fasting and it usually controlled. He has been taking janumet, but has not required insulin. However recently Gerard has been giving him the insulin on a sliding scale. Pt not inhibiting any sign of infection. no fever. no URI symptoms, no dysuria.

## 2020-05-13 PROBLEM — R35.0 URINARY FREQUENCY: Status: ACTIVE | Noted: 2020-01-01

## 2020-05-13 PROBLEM — F03.90 DEMENTIA WITHOUT BEHAVIORAL DISTURBANCE: Status: ACTIVE | Noted: 2019-03-22

## 2020-05-13 PROBLEM — E78.5 HYPERLIPIDEMIA: Status: ACTIVE | Noted: 2019-07-01

## 2020-05-13 PROBLEM — R63.0 POOR APPETITE: Status: ACTIVE | Noted: 2019-02-22
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
## 2020-05-13 PROBLEM — I10 HYPERTENSION: Status: ACTIVE | Noted: 2018-07-22

## 2020-05-13 PROBLEM — S01.81XA FOREHEAD LACERATION, INITIAL ENCOUNTER: Status: ACTIVE | Noted: 2020-01-01

## 2020-05-13 NOTE — HISTORY OF PRESENT ILLNESS
[Family Member] : family member [FreeTextEntry1] : fall last night\par Feels weak\par high bgm [de-identified] : Pt here with his son. As per Gerard, pt's BGM has been high in 200's over the past 2 weeks.\par B/p has also been higher than usual as per their monitor. However pt has not been eating well. He consumes a good meal for breakfast, has a boost and a decent lunch but barely any dinner.\par He also gets episodes of  rapid shallow breathing, and then his son tells him to calm down and he does. Short lasting episodes.\par No chest pains, No sores on body. No urinary complaints, no diarrhea, no fevers\par Pt then fell last night on the way to bathroom and cut his left forehead. He did not wake his son, who then saw it when he woke up about 4 AM.\par Pt is also very easily fatigued lately.

## 2020-05-13 NOTE — PHYSICAL EXAM
[No Respiratory Distress] : no respiratory distress  [No Accessory Muscle Use] : no accessory muscle use [Clear to Auscultation] : lungs were clear to auscultation bilaterally [Normal Rate] : normal rate  [Speech Grossly Normal] : speech grossly normal [No Acute Distress] : no acute distress [PERRL] : pupils equal round and reactive to light [Regular Rhythm] : with a regular rhythm [Normal S1, S2] : normal S1 and S2 [de-identified] : 1 inch cut to left forehead, already has a scab and not currently bleeding.  [de-identified] : bruise around left eye [de-identified] : moves all extremities

## 2020-05-13 NOTE — HEALTH RISK ASSESSMENT
[No] : In the past 12 months have you used drugs other than those required for medical reasons? No [Any fall with injury in past year] : Patient reported fall with injury in the past year [Assistive Device] : Patient uses an assistive device [0] : 2) Feeling down, depressed, or hopeless: Not at all (0) [] : No [de-identified] : none [de-identified] : none [de-identified] : wheelchair

## 2020-05-13 NOTE — REVIEW OF SYSTEMS
[Fatigue] : fatigue [Dyspnea on Exertion] : dyspnea on exertion [Frequency] : frequency [Depression] : depression [Fever] : no fever [Chest Pain] : no chest pain [Wheezing] : no wheezing [Palpitations] : no palpitations [Cough] : no cough [Abdominal Pain] : no abdominal pain [Dysuria] : no dysuria [FreeTextEntry2] : weakness overall [FreeTextEntry6] : intermittent shallow rapid breath [de-identified] : cut on left forehead

## 2020-05-16 PROBLEM — T68.XXXA HYPOTHERMIA: Status: ACTIVE | Noted: 2020-01-01

## 2020-05-16 PROBLEM — R73.9 HYPERGLYCEMIA: Status: ACTIVE | Noted: 2020-01-01

## 2020-05-16 PROBLEM — R53.1 WEAKNESS: Status: ACTIVE | Noted: 2020-01-01

## 2020-05-16 PROBLEM — Z51.5 HOSPICE CARE PATIENT: Status: ACTIVE | Noted: 2020-01-01

## 2020-05-16 PROBLEM — R06.02 SHORTNESS OF BREATH: Status: ACTIVE | Noted: 2020-01-01

## 2020-05-28 PROBLEM — D64.9 ANEMIA, UNSPECIFIED TYPE: Status: ACTIVE | Noted: 2020-01-01

## 2020-05-28 PROBLEM — I50.22 CHRONIC SYSTOLIC HEART FAILURE: Status: ACTIVE | Noted: 2019-02-22

## 2020-05-28 PROBLEM — E11.9 DIABETES: Status: ACTIVE | Noted: 2018-07-22

## 2020-05-28 NOTE — HISTORY OF PRESENT ILLNESS
[Home] : at home, [unfilled] , at the time of the visit. [Medical Office: ([facility])___] : at the medical office located in  [Verbal consent obtained from patient] : the patient, [unfilled] [FreeTextEntry1] : follow up visit [de-identified] : Iron has been causing him diarrhea\par Has not needed humalog for the past week. Fasting has been between 100-120. he has been on lantus between 6-10 units. \par Pt is now using walker all the time, no recent falls.\par Has not needed oxygen much over past few days.

## 2020-08-30 PROBLEM — I63.9 STROKE: Status: ACTIVE | Noted: 2020-01-01

## 2020-09-14 ENCOUNTER — APPOINTMENT (OUTPATIENT)
Dept: CARDIOLOGY | Facility: CLINIC | Age: 85
End: 2020-09-14

## 2020-09-24 ENCOUNTER — APPOINTMENT (OUTPATIENT)
Dept: CARDIOLOGY | Facility: CLINIC | Age: 85
End: 2020-09-24